# Patient Record
Sex: FEMALE | Race: WHITE | HISPANIC OR LATINO | Employment: FULL TIME | URBAN - METROPOLITAN AREA
[De-identification: names, ages, dates, MRNs, and addresses within clinical notes are randomized per-mention and may not be internally consistent; named-entity substitution may affect disease eponyms.]

---

## 2019-06-25 ENCOUNTER — OFFICE VISIT (OUTPATIENT)
Dept: INTERNAL MEDICINE CLINIC | Facility: CLINIC | Age: 49
End: 2019-06-25

## 2019-06-25 VITALS
SYSTOLIC BLOOD PRESSURE: 126 MMHG | HEIGHT: 62 IN | BODY MASS INDEX: 27.99 KG/M2 | TEMPERATURE: 98.1 F | WEIGHT: 152.12 LBS | DIASTOLIC BLOOD PRESSURE: 80 MMHG | HEART RATE: 72 BPM

## 2019-06-25 DIAGNOSIS — K30 INDIGESTION: Chronic | ICD-10-CM

## 2019-06-25 DIAGNOSIS — Z00.00 HEALTH CARE MAINTENANCE: Chronic | ICD-10-CM

## 2019-06-25 DIAGNOSIS — G43.109 MIGRAINE WITH AURA AND WITHOUT STATUS MIGRAINOSUS, NOT INTRACTABLE: Chronic | ICD-10-CM

## 2019-06-25 DIAGNOSIS — E66.3 OVERWEIGHT (BMI 25.0-29.9): Primary | Chronic | ICD-10-CM

## 2019-06-25 DIAGNOSIS — Z12.31 VISIT FOR SCREENING MAMMOGRAM: ICD-10-CM

## 2019-06-25 DIAGNOSIS — J30.2 SEASONAL ALLERGIES: Chronic | ICD-10-CM

## 2019-06-25 PROCEDURE — 99203 OFFICE O/P NEW LOW 30 MIN: CPT | Performed by: PHYSICIAN ASSISTANT

## 2019-06-26 ENCOUNTER — APPOINTMENT (OUTPATIENT)
Dept: LAB | Facility: CLINIC | Age: 49
End: 2019-06-26

## 2019-06-26 LAB
ALBUMIN SERPL BCP-MCNC: 3.8 G/DL (ref 3.5–5)
ALP SERPL-CCNC: 98 U/L (ref 46–116)
ALT SERPL W P-5'-P-CCNC: 25 U/L (ref 12–78)
ANION GAP SERPL CALCULATED.3IONS-SCNC: 5 MMOL/L (ref 4–13)
AST SERPL W P-5'-P-CCNC: 15 U/L (ref 5–45)
BASOPHILS # BLD AUTO: 0.04 THOUSANDS/ΜL (ref 0–0.1)
BASOPHILS NFR BLD AUTO: 1 % (ref 0–1)
BILIRUB SERPL-MCNC: 0.35 MG/DL (ref 0.2–1)
BUN SERPL-MCNC: 16 MG/DL (ref 5–25)
CALCIUM SERPL-MCNC: 9.6 MG/DL (ref 8.3–10.1)
CHLORIDE SERPL-SCNC: 105 MMOL/L (ref 100–108)
CHOLEST SERPL-MCNC: 186 MG/DL (ref 50–200)
CO2 SERPL-SCNC: 29 MMOL/L (ref 21–32)
CREAT SERPL-MCNC: 0.84 MG/DL (ref 0.6–1.3)
EOSINOPHIL # BLD AUTO: 0.07 THOUSAND/ΜL (ref 0–0.61)
EOSINOPHIL NFR BLD AUTO: 1 % (ref 0–6)
ERYTHROCYTE [DISTWIDTH] IN BLOOD BY AUTOMATED COUNT: 12.5 % (ref 11.6–15.1)
GFR SERPL CREATININE-BSD FRML MDRD: 82 ML/MIN/1.73SQ M
GLUCOSE P FAST SERPL-MCNC: 99 MG/DL (ref 65–99)
HCT VFR BLD AUTO: 43.6 % (ref 34.8–46.1)
HDLC SERPL-MCNC: 57 MG/DL (ref 40–60)
HGB BLD-MCNC: 13.6 G/DL (ref 11.5–15.4)
IMM GRANULOCYTES # BLD AUTO: 0.01 THOUSAND/UL (ref 0–0.2)
IMM GRANULOCYTES NFR BLD AUTO: 0 % (ref 0–2)
LDLC SERPL CALC-MCNC: 117 MG/DL (ref 0–100)
LYMPHOCYTES # BLD AUTO: 1.34 THOUSANDS/ΜL (ref 0.6–4.47)
LYMPHOCYTES NFR BLD AUTO: 24 % (ref 14–44)
MCH RBC QN AUTO: 26.8 PG (ref 26.8–34.3)
MCHC RBC AUTO-ENTMCNC: 31.2 G/DL (ref 31.4–37.4)
MCV RBC AUTO: 86 FL (ref 82–98)
MONOCYTES # BLD AUTO: 0.43 THOUSAND/ΜL (ref 0.17–1.22)
MONOCYTES NFR BLD AUTO: 8 % (ref 4–12)
NEUTROPHILS # BLD AUTO: 3.76 THOUSANDS/ΜL (ref 1.85–7.62)
NEUTS SEG NFR BLD AUTO: 66 % (ref 43–75)
NRBC BLD AUTO-RTO: 0 /100 WBCS
PLATELET # BLD AUTO: 218 THOUSANDS/UL (ref 149–390)
PMV BLD AUTO: 11.2 FL (ref 8.9–12.7)
POTASSIUM SERPL-SCNC: 4.3 MMOL/L (ref 3.5–5.3)
PROT SERPL-MCNC: 7.5 G/DL (ref 6.4–8.2)
RBC # BLD AUTO: 5.08 MILLION/UL (ref 3.81–5.12)
SODIUM SERPL-SCNC: 139 MMOL/L (ref 136–145)
TRIGL SERPL-MCNC: 61 MG/DL
WBC # BLD AUTO: 5.65 THOUSAND/UL (ref 4.31–10.16)

## 2019-06-26 PROCEDURE — 80053 COMPREHEN METABOLIC PANEL: CPT | Performed by: PHYSICIAN ASSISTANT

## 2019-06-26 PROCEDURE — 85025 COMPLETE CBC W/AUTO DIFF WBC: CPT | Performed by: PHYSICIAN ASSISTANT

## 2019-06-26 PROCEDURE — 80061 LIPID PANEL: CPT | Performed by: PHYSICIAN ASSISTANT

## 2019-06-26 PROCEDURE — 36415 COLL VENOUS BLD VENIPUNCTURE: CPT | Performed by: PHYSICIAN ASSISTANT

## 2021-01-18 ENCOUNTER — OFFICE VISIT (OUTPATIENT)
Dept: INTERNAL MEDICINE CLINIC | Facility: CLINIC | Age: 51
End: 2021-01-18

## 2021-01-18 VITALS
WEIGHT: 164 LBS | HEART RATE: 78 BPM | TEMPERATURE: 98.2 F | BODY MASS INDEX: 30.18 KG/M2 | DIASTOLIC BLOOD PRESSURE: 78 MMHG | SYSTOLIC BLOOD PRESSURE: 127 MMHG | OXYGEN SATURATION: 97 % | HEIGHT: 62 IN

## 2021-01-18 DIAGNOSIS — Z12.4 CERVICAL CANCER SCREENING: ICD-10-CM

## 2021-01-18 DIAGNOSIS — E66.09 CLASS 1 OBESITY DUE TO EXCESS CALORIES WITHOUT SERIOUS COMORBIDITY WITH BODY MASS INDEX (BMI) OF 30.0 TO 30.9 IN ADULT: ICD-10-CM

## 2021-01-18 DIAGNOSIS — E78.00 ELEVATED LDL CHOLESTEROL LEVEL: ICD-10-CM

## 2021-01-18 DIAGNOSIS — M65.9 TENOSYNOVITIS OF RIGHT WRIST: Primary | ICD-10-CM

## 2021-01-18 DIAGNOSIS — Z12.31 VISIT FOR SCREENING MAMMOGRAM: ICD-10-CM

## 2021-01-18 PROBLEM — Z00.00 HEALTH CARE MAINTENANCE: Chronic | Status: RESOLVED | Noted: 2019-06-25 | Resolved: 2021-01-18

## 2021-01-18 PROBLEM — E66.3 OVERWEIGHT (BMI 25.0-29.9): Chronic | Status: RESOLVED | Noted: 2019-06-25 | Resolved: 2021-01-18

## 2021-01-18 PROBLEM — K30 INDIGESTION: Chronic | Status: RESOLVED | Noted: 2019-06-25 | Resolved: 2021-01-18

## 2021-01-18 PROBLEM — E66.811 CLASS 1 OBESITY DUE TO EXCESS CALORIES WITHOUT SERIOUS COMORBIDITY WITH BODY MASS INDEX (BMI) OF 30.0 TO 30.9 IN ADULT: Status: ACTIVE | Noted: 2021-01-18

## 2021-01-18 PROCEDURE — 3008F BODY MASS INDEX DOCD: CPT | Performed by: PHYSICIAN ASSISTANT

## 2021-01-18 PROCEDURE — 3725F SCREEN DEPRESSION PERFORMED: CPT | Performed by: PHYSICIAN ASSISTANT

## 2021-01-18 PROCEDURE — 1036F TOBACCO NON-USER: CPT | Performed by: PHYSICIAN ASSISTANT

## 2021-01-18 PROCEDURE — 99213 OFFICE O/P EST LOW 20 MIN: CPT | Performed by: PHYSICIAN ASSISTANT

## 2021-01-18 RX ORDER — NAPROXEN 500 MG/1
500 TABLET ORAL 2 TIMES DAILY WITH MEALS
Qty: 28 TABLET | Refills: 0 | Status: SHIPPED | OUTPATIENT
Start: 2021-01-18 | End: 2021-05-05 | Stop reason: ALTCHOICE

## 2021-01-18 NOTE — PATIENT INSTRUCTIONS
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 and you are right hand dominant  We discussed importance of resting your right wrist and medication which has been sent to your pharmacy  Please take the naproxen 1 tablet twice a day with food for at least 7 days but up to 2 weeks  We reviewed the importance of resting your right hand and wrist because if you do not the pain will not have an opportunity to improve  This includes not doing repetitive motions in her home such as cooking and cleaning and getting some assistance to help you with those  If however your pain does not improve with medication and rest please call our office and you will need referral to physical therapy  Script provided today to call and schedule your mammogram and get your labs completed and once we have those results we can then get you scheduled for a well visit  Enfermedad de de Nasim   LO QUE NECESITA SABER:   ¿Qué es la enfermedad de Shaunna Eaves? La enfermedad de De [place]erv[place] es joana enfermedad inflamatoria de los tendones en el lado del pulgar en neal [address]  Los tendones son tiras de tejido gruesos que [employer] a los [place]  ¿Qué causa la enfermedad de Shaunna Eaves? La enfermedad de [place] es generalmente causada por movimientos frecuentes y repetidos del pulgar o la hugh  Por ejemplo, levantar un nory pequeño, coser, escribir en teclado, o tocar el piano pueden causar inflamación  Un golpe directo al pulgar también puede dañar el tendón y formar tejido cicatrizado  Wendy tejido cicatrizado puede impedir que el tendón funcione apropiadamente  ¿Cuáles son los signos y síntomas de la enfermedad de [place]? · Dolor e hinchazón cerca de la base de neal pulgar son los síntomas más comunes   [place] generalmente ocurre cuando usted mueve neal hugh hacia arriba y Sher, sostiene un objeto o hace un puño  · Usted escucha un eileen crujiente cuando mueve o fricciona aden pulgar o hugh  · Aden pulgar y [place] pueden estar débiles y usted puede tener movimiento limitado  ¿Cómo se diagnostica la enfermedad de [place]? Aden médico le preguntará acerca de aden historial médico y lo examinará  Él le pedirá que alyson un puño con aden pulgar tocando la pritchard de aden mano  Luego, él le pedirá que mueva aden mano y aden hugh en ciertas direcciones  ÉL revisará si usted tiene dolor, debilidad o problemas de [place]  Ambas trace pueden necesitar ser revisadas  Es posible que también necesite alguno de los siguientes tratamientos:  · Radiografía: Usted puede necesitar imágenes de aden hugh y mano para determinar si hay joana fractura  Anil-X de ambas trace y [employer] pueden ser realizadas  · Imágenes por resonancia magnética (IRM): Wendy escán [place] imanes kelly y Elinyaa Escobar computadora para nisha imágenes de aden hugh y [place]  Un IRM puede mostrar si usted tiene la enfermedad de De Quervain  Le podrían administrar un tinte para ayudar a que las imágenes se vean mejor  Dígale a los médicos si tiene alergia al iodo o a los mariscos  También podría ser alérgico al tinte  No entre a la jeferson donde se realiza la resonancia magnética con algo de metal  El metal puede causar lesiones serias  Informe a ki médicos si usted tiene algún metal dentro o sobre aden cuerpo  ¿Cómo se trata la enfermedad de [place] Dayan? · Medicamentos:     ? AINEs (analgésicos antiinflamatorios no esteroides): Estos medicamentos disminuyen la inflamación, dolor y [place]  Están disponibles sin receta médica  Pregunte cuál de estos medicamentos es apropiado para usted y qué dosis debería nisha  Tómelos len se le indique  Cuando no se marc de la [employer], los medicamentos antiinflamatorios no esteroides pueden causar sangrado estomacal o problemas renales  ? Inyección de esteroides: [place] reduce el dolor duradero y la inflamación   Usualmente se inyecta en neal hugh o mano  · Cirugía: [place] puede ser realizado si los otros tratamientos no funcionan o neal dolor interfiere con ki actividades diarias  [place]iss[place] la hospitals, joana incisión se hace en el tejido que cubre el tendón  [place] ayuda a aliviar la presión y reducir el dolor par que neal tendón puede moverse libremente  ¿Cómo puedo controlar los síntomas? · [place] o abrazadera: Estos aparatos pueden ayudar a disminuir el dolor, limitar el movimiento y proteger neal hugh para que sane  Asegúrese de que neal aparato es cómodo  Si le queda muy ajustado, ki dedos pueden sentirse entumecidos o con hormigueo  No empuje o se recueste en neal aparato porque se puede quebrar  · Fisioterapia o terapia ocupacional: Puede que necesite visitar un fisioterapeuta o un terapeuta ocupacional para que le enseñe ejercicios especiales  Estos ejercicios ayudarán a mejorar el movimiento y disminuir neal dolor  También puede ayudar a mejorar la fuerza y disminuir neal riesgo para pérdida de función  Los terapeutas pueden ayudarlo a hacer cambios a ki actividades diarias para disminuir el estrés y la presión en ki tendones  · Descanse: Descanse neal pulgar o hugh lesionado  Evite movimientos de torcer, agarrar o sostener  Pregunte cuándo puede regresar a ki [facility]  ¿Cuáles son los riesgos de la enfermedad de [place]? · Neal pulgar o neal hugh puede que no se muevan len hacían anteriormente, aun después de [place]  [place] puede nisha tiempo y compromiso a terapia para recuperar neal fuerza y el movimiento normal de neal pulgar y [address]  · Sin tratamiento, usted puede tener aumento de dolor e hinchazón  Con el tiempo, neal movimiento y función disminuirán  Eventualmente, usted no podrá  o usar neal pulgar o [address]  ¿Cuándo uzma comunicarme con mi médico?  · Neal férula o abrazadera está muy ajustada y no puede aflojarla  · Tiene fiebre  · Neal dolor e Pamelia Radha o no desaparecen      · Usted no puede sujetar objetos a causa del dolor e hinchazón  · Usted tiene preguntas o inquietudes acerca de neal condición o cuidado  ¿Cuándo uzma buscar atención inmediata? · Usted no puede  neal pulgar o neal hugh  · Ki dedos se sienten entumecidos, con hormigueo, frescos al tocar, o se pamela azulados o pálidos  ACUERDOS SOBRE NEAL CUIDADO:   Usted tiene el derecho de ayudar a planear neal cuidado  Aprenda todo lo que pueda sobre neal condición y len darle tratamiento  Discuta ki opciones de tratamiento con ki médicos para decidir el cuidado que usted desea recibir  Usted siempre tiene el derecho de rechazar el tratamiento  Esta información es sólo para uso en educación  Neal intención no es darle un consejo médico sobre enfermedades o tratamientos  Colsulte con neal [place]iva [place] farmacéutico antes de seguir cualquier régimen médico para saber si es seguro y efectivo para usted  © Copyright [address] Information is for End User's use only and may not be sold, redistributed or otherwise used for commercial purposes   All illustrations and images included in CareNotes® are the copyrighted property of A D A M , Inc  or [address] [place] [address]

## 2021-01-18 NOTE — LETTER
January 18, 2021     Patient: Sandy Morin   YOB: 1970   Date of Visit: 1/18/2021       To Whom it May Concern:    Sandy Morin is under my professional care  She was seen in my office on 1/18/2021  She may return to work on 1/23/2021  If you have any questions or concerns, please don't hesitate to call           Sincerely,          Octavio Onofre PA-C        CC: No Recipients

## 2021-01-18 NOTE — PROGRESS NOTES
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  We discussed importance of resting your right wrist and medication which has been sent to your pharmacy  Please take the naproxen 1 tablet twice a day with food for at least 7 days but up to 2 weeks  We reviewed the importance of resting your right hand and wrist because if you do not the pain will not have an opportunity to improve  This includes not doing repetitive motions in her home such as cooking and cleaning and getting some assistance to help you with those  If however your pain does not improve with medication and rest please call our office and you will need referral to physical therapy  Script provided today to call and schedule your mammogram and get your labs completed and once we have those results we can then get you scheduled for a well visit  No problem-specific Assessment & Plan notes found for this encounter  Diagnoses and all orders for this visit:    Tenosynovitis of right wrist  -     naproxen (NAPROSYN) 500 mg tablet; Take 1 tablet (500 mg total) by mouth 2 (two) times a day with meals for 14 days    Cervical cancer screening  -     Ambulatory referral to Obstetrics / Gynecology; Future    Elevated LDL cholesterol level  -     Comprehensive metabolic panel  -     Lipid Panel with Direct LDL reflex    Class 1 obesity due to excess calories without serious comorbidity with body mass index (BMI) of 30 0 to 30 9 in adult    Visit for screening mammogram  -     Mammo screening bilateral w cad; Future          Subjective:      Patient ID: Pedro Domingo is a 48 y o  female  Patient presents today for episodic with complaint of R wrist pain X 2 weeks  Never had before, states pain is R thumb extending into R forearm  Did not take any meds for pain       Of note 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  Patient reports she has had the same job for the past 2 years but denies pain prior to this  Patient denies any change in activities at home  Patient states she normally works Friday Saturday Sunday for 10 hours  Has not been seen since 2019 one time  Is overdue for health  Maintenance, mammo, gyn and now colon cancer screening  The following portions of the patient's history were reviewed and updated as appropriate: allergies, current medications, past family history, past medical history, past social history, past surgical history and problem list     Review of Systems   Constitutional: Negative  HENT: Negative  Respiratory: Negative  Negative for cough and shortness of breath  Cardiovascular: Negative  Negative for chest pain  Gastrointestinal: Negative  Musculoskeletal: Positive for arthralgias and myalgias  Negative for joint swelling  Skin: Negative for rash  Neurological: Negative for weakness and numbness  Psychiatric/Behavioral: Negative  Objective:      /78 (BP Location: Right arm, Patient Position: Sitting, Cuff Size: Standard)   Pulse 78   Temp 98 2 °F (36 8 °C) (Temporal)   Ht 5' 2" (1 575 m)   Wt 74 4 kg (164 lb)   SpO2 97%   BMI 30 00 kg/m²          Physical Exam  Vitals signs and nursing note reviewed  Constitutional:       General: She is not in acute distress  HENT:      Head: Normocephalic  Eyes:      Conjunctiva/sclera: Conjunctivae normal    Cardiovascular:      Rate and Rhythm: Normal rate and regular rhythm  Heart sounds: No murmur  Pulmonary:      Effort: Pulmonary effort is normal       Breath sounds: Normal breath sounds  Musculoskeletal:         General: Tenderness present  No swelling, deformity or signs of injury  Comments: Left upper extremity  5/5  Right upper extremity  3/5 secondary to eliciting pain      Positive Finkelstein on right negative on left   Negative Tinel and Phalen   Neurological:      Mental Status: She is alert  Sensory: No sensory deficit  Motor: No weakness  Deep Tendon Reflexes: Reflexes normal    Psychiatric:         Mood and Affect: Mood normal          Thought Content:  Thought content normal

## 2021-01-19 ENCOUNTER — APPOINTMENT (OUTPATIENT)
Dept: LAB | Facility: HOSPITAL | Age: 51
End: 2021-01-19
Payer: COMMERCIAL

## 2021-01-19 LAB
ALBUMIN SERPL BCP-MCNC: 3.8 G/DL (ref 3.5–5)
ALP SERPL-CCNC: 114 U/L (ref 46–116)
ALT SERPL W P-5'-P-CCNC: 32 U/L (ref 12–78)
ANION GAP SERPL CALCULATED.3IONS-SCNC: 4 MMOL/L (ref 4–13)
AST SERPL W P-5'-P-CCNC: 24 U/L (ref 5–45)
BILIRUB SERPL-MCNC: 0.52 MG/DL (ref 0.2–1)
BUN SERPL-MCNC: 11 MG/DL (ref 5–25)
CALCIUM SERPL-MCNC: 9.4 MG/DL (ref 8.3–10.1)
CHLORIDE SERPL-SCNC: 110 MMOL/L (ref 100–108)
CHOLEST SERPL-MCNC: 156 MG/DL (ref 50–200)
CO2 SERPL-SCNC: 28 MMOL/L (ref 21–32)
CREAT SERPL-MCNC: 0.72 MG/DL (ref 0.6–1.3)
GFR SERPL CREATININE-BSD FRML MDRD: 98 ML/MIN/1.73SQ M
GLUCOSE P FAST SERPL-MCNC: 94 MG/DL (ref 65–99)
HDLC SERPL-MCNC: 60 MG/DL
LDLC SERPL CALC-MCNC: 86 MG/DL (ref 0–100)
POTASSIUM SERPL-SCNC: 4.2 MMOL/L (ref 3.5–5.3)
PROT SERPL-MCNC: 7.3 G/DL (ref 6.4–8.2)
SODIUM SERPL-SCNC: 142 MMOL/L (ref 136–145)
TRIGL SERPL-MCNC: 51 MG/DL

## 2021-01-19 PROCEDURE — 36415 COLL VENOUS BLD VENIPUNCTURE: CPT | Performed by: PHYSICIAN ASSISTANT

## 2021-01-19 PROCEDURE — 80061 LIPID PANEL: CPT | Performed by: PHYSICIAN ASSISTANT

## 2021-01-19 PROCEDURE — 80053 COMPREHEN METABOLIC PANEL: CPT | Performed by: PHYSICIAN ASSISTANT

## 2021-05-05 ENCOUNTER — OFFICE VISIT (OUTPATIENT)
Dept: INTERNAL MEDICINE CLINIC | Facility: CLINIC | Age: 51
End: 2021-05-05

## 2021-05-05 VITALS
WEIGHT: 167 LBS | OXYGEN SATURATION: 99 % | BODY MASS INDEX: 30.73 KG/M2 | HEART RATE: 60 BPM | TEMPERATURE: 97.6 F | HEIGHT: 62 IN | SYSTOLIC BLOOD PRESSURE: 130 MMHG | DIASTOLIC BLOOD PRESSURE: 84 MMHG

## 2021-05-05 DIAGNOSIS — J30.2 SEASONAL ALLERGIES: Chronic | ICD-10-CM

## 2021-05-05 DIAGNOSIS — E66.09 CLASS 1 OBESITY DUE TO EXCESS CALORIES WITHOUT SERIOUS COMORBIDITY WITH BODY MASS INDEX (BMI) OF 30.0 TO 30.9 IN ADULT: ICD-10-CM

## 2021-05-05 DIAGNOSIS — Z00.00 ANNUAL PHYSICAL EXAM: Primary | ICD-10-CM

## 2021-05-05 DIAGNOSIS — Z12.11 COLON CANCER SCREENING: ICD-10-CM

## 2021-05-05 PROCEDURE — 3008F BODY MASS INDEX DOCD: CPT | Performed by: PHYSICIAN ASSISTANT

## 2021-05-05 PROCEDURE — 1036F TOBACCO NON-USER: CPT | Performed by: PHYSICIAN ASSISTANT

## 2021-05-05 PROCEDURE — 99396 PREV VISIT EST AGE 40-64: CPT | Performed by: PHYSICIAN ASSISTANT

## 2021-05-05 RX ORDER — LORATADINE 10 MG/1
10 TABLET ORAL DAILY
Qty: 90 TABLET | Refills: 1 | Status: SHIPPED | OUTPATIENT
Start: 2021-05-05 | End: 2021-09-16

## 2021-05-05 RX ORDER — OLOPATADINE HYDROCHLORIDE 1 MG/ML
1 SOLUTION/ DROPS OPHTHALMIC 2 TIMES DAILY PRN
Qty: 5 ML | Refills: 1 | Status: SHIPPED | OUTPATIENT
Start: 2021-05-05

## 2021-05-05 RX ORDER — FLUTICASONE PROPIONATE 50 MCG
1 SPRAY, SUSPENSION (ML) NASAL DAILY
Qty: 1 BOTTLE | Refills: 1 | Status: SHIPPED | OUTPATIENT
Start: 2021-05-05 | End: 2021-07-08

## 2021-05-05 NOTE — PATIENT INSTRUCTIONS
We completed your annual health visit today  You are currently up-to-date with most of your health maintenance including dentist and eye exams  I have reprinted her order so you can schedule your mammogram and also please remember to schedule with gyn for routine women's health and Pap smear  Script also provided for referral to GI to get scheduled for colon cancer screening  While you admit to healthy diet we did review the importance of increasing her physical activity for healthy meaningful weight loss  At discharge you will be able to get scheduled for your COVID vaccine  Seasonal allergy medication has been sent to your pharmacy  We did review however if your insurance does not cover the medications these are available over-the-counter  Control del peso   LO QUE NECESITA SABER:   ¿Por qué es importante controlar mi peso corporal? Tener sobrepeso aumenta neal riesgo de presentar problemas de [place] len enfermedades del corazón, hipertensión, diabetes tipo 2 y ciertos tipos de cáncer  También puede aumentar neal riesgo de presentar osteoartritis, apnea del sueño y otros problemas respiratorios  Trate de bajar de peso de forma gradual y [place]  Incluso joana mínima pérdida de peso puede disminuir neal riesgo de problemas de [place]  ¿Cómo puedo bajar de peso de Daryle Melara forma de la rosa? Joana forma de la rosa y saludable para perder peso es consumir menos calorías y realizar joana actividad física en forma regular  · Usted puede perder hasta 1 sae por semana al reducir el consumo de 500 calorías cada día  Usted puede reducir el consumo de calorías al comer porciones más pequeñas o eliminar los alimentos con alto contenido de calorías  Sruthi las etiquetas para determinar la cantidad de calorías que contienen los alimentos que consume  · También puede quemar calorías al realizar ejercicio len caminar, nadar o montar en bicicleta   Es más probable que usted mantenga el peso si hace de [place] cambios parte de neal estilo de jonnie  Realice joana actividad física por lo menos 30 minutos al día, la mayoría de los días de la [place]  Usted también puede realizar más actividad física usando las escaleras en vez de los ascensores o estacionarse más lejos cuando Oral Nan a las tiendas  Pregunte a neal médico acerca del mejor plan de ejercicio para usted  ¿Cuál es un plan de alimentación qué me puede ayudar a controlar mi peso? Un plan de alimentación saludable incluye joana variedad de alimentos, contiene más pocas calorías y lo [place] a estar saludable  Un plan de alimentación saludable incluye lo siguiente:     · Consuma alimentos de grano integral con más frecuencia  Un plan de alimentación saludable debe contener alimentos con fibra  La fibra es la parte de las frutas, verduras y granos que neal cuerpo no puede digerir  Los alimentos de granos integrales son saludables y suministran fibra adicional a neal Irina Hasting  Algunos ejemplos de alimentos de granos integrales son los panes integrales, pastas integrales, la alexandra, el arroz integral y jorge a de bulgur  · Consuma joana variedad de verduras todos los días  [address], coliflor, col henok y [place]  Coma verduras anaranjadas len las zanahorias, bella dulces y calabaza de invierno  · Consuma joana variedad de frutas todos los sunday  Escoja frutas frescas o enlatadas en neal propio jugo o con jugo bajo en [place] en vez de jugo  El [place] de frutas tiene [address] o my nada de [place]  · Consuma productos lácteos con bajo contenido de Wes Pulley [place] [address] de 1%  Consuma yogur descremado y requesón (cottage) semidescremado  Trate de consumir quesos descremados len el queso mozzarela y otros quesos semidescremado  · Seleccione meka y otros alimentos con proteínas bajos en grasa  Escoja frijoles u [address]   Seleccione pescado, carne de aves sin piel (len el Ricka Riis), schultz de carne [place] (de res o de cerdo)  Antes de cocinar las meka o las aves jose cualquier parte de grasa visible  · Use menos grasas y aceites  Trate de hornear los alimentos en lugar de freírlos  Agregue a las [address] [facility], len [place], crema [place], condimentos para [place] y [facility]  Consuma menos alimentos de alto tenor graso  Coma menos alimentos altos en grasa len las bella fritas, donas, helados y pasteles  · Consuma menos dulces  Limite los alimentos y las bebidas con un gran contenido de azúcar  Estos incluyen los caramelos, galletas, gaseosas normales y bebidas endulzadas  ¿Cuáles son las otras formas en que puedo disminuir las calorías? · Reduzca el tamaño de las porciones  ? Use platos pequeños para servirse porciones pequeñas  ? No coma segundas porciones  ? Cuando coma en un restaurante, pida joana Priscella Hoyles y guarde en verenice la mitad de la comida antes de empezar a comer  ? Comparta con alguien un plato de entrada  · Reemplace los bocadillos o meriendas altos en calorías por los saludables de menos calorías  ? Escoja frutas frescas, verduras, galletas de arroz bajo en grasa o palomitas de maíz en lugar de comer bella fritas de paquete, nueces o dulces de chocolate  ? [place] agua o bebidas dietéticas en lugar de las endulzadas  · No vaya al johnson cuando tenga hambre  Usted podría ser más propenso a elegir alimentos no saludables  Lleve joana lista de alimentos saludables y vaya de compras después de steven comido  · Coma ki comidas regularmente  No se salte ninguna comida  No omita ninguna comida porque esto puede conducir a comer más a joana hora más tarde del día  [place] podría traerle problemas para perder peso  Si no tiene tiempo para hacer comidas regulares, consuma un refrigerio saludable  Hable con un dietista para que lo ayude a crear un plan de comidas y un horario que alo adecuados para usted      ¿Qué más debería tener en cuenta mientras trato de bajar de peso? · Esté consciente de las situaciones que podrían ocasionarle ganas de comer en exceso, len el comer mientras molly la televisión  Busque formas para evitar estas situaciones  Por ejemplo, leer un libro, caminar o hacer trabajos manuales  · Reúnase con un bronson de apoyo o con personas que también están tratando de bajar de [place]  [place] le puede ayudar a mantenerse motivado y continuar progresando en neal objetivo de perder peso  ACUERDOS SOBRE NEAL CUIDADO:   Usted tiene el derecho de ayudar a planear neal cuidado  Aprenda todo lo que pueda sobre neal condición y len darle tratamiento  Discuta ki opciones de tratamiento con ki médicos para decidir el cuidado que usted desea recibir  Usted siempre tiene el derecho de rechazar el tratamiento  Esta información es sólo para uso en educación  Neal intención no es darle un consejo médico sobre enfermedades o tratamientos  Colsulte con neal Ladena Creed farmacéutico antes de seguir cualquier régimen médico para saber si es seguro y efectivo para usted  © Copyright [address] Information is for End User's use only and may not be sold, redistributed or otherwise used for commercial purposes   All illustrations and images included in CareNotes® are the copyrighted property of A D A LOYAL3 , Inc  or sciencebite [facility]

## 2021-05-05 NOTE — PROGRESS NOTES
[address] [facility]    NAME: Yolette Barkley  AGE: 46 y o  SEX: female  : 1970     DATE: 2021     Assessment and Plan: We completed your annual health visit today  You are currently up-to-date with most of your health maintenance including dentist and eye exams  I have reprinted her order so you can schedule your mammogram and also please remember to schedule with gyn for routine women's health and Pap smear  Script also provided for referral to GI to get scheduled for colon cancer screening  While you admit to healthy diet we did review the importance of increasing her physical activity for healthy meaningful weight loss  At discharge you will be able to get scheduled for your COVID vaccine  Seasonal allergy medication has been sent to your pharmacy  We did review however if your insurance does not cover the medications these are available over-the-counter  Problem List Items Addressed This Visit        Other    Class 1 obesity due to excess calories without serious comorbidity with body mass index (BMI) of 30 0 to 30 9 in adult    Seasonal allergies (Chronic)    Relevant Medications    loratadine (CLARITIN) 10 mg tablet    fluticasone (FLONASE) 50 mcg/act nasal spray    olopatadine (PATANOL) 0 1 % ophthalmic solution      Other Visit Diagnoses     Annual physical exam    -  Primary    Colon cancer screening        Relevant Orders    Ambulatory referral to Gastroenterology          Immunizations and preventive care screenings were discussed with patient today  Appropriate education was printed on patient's after visit summary  Counseling:  Alcohol/drug use: discussed moderation in alcohol intake, the recommendations for healthy alcohol use, and avoidance of illicit drug use  Dental Health: discussed importance of regular tooth brushing, flossing, and dental visits    · Exercise: the importance of regular exercise/physical activity was discussed  Recommend exercise 3-5 times per week for at least 30 minutes  Return in about 1 year (around 2022)  Chief Complaint:     Chief Complaint   Patient presents with    Annual Exam     Patient dosen't have any complaint  Patient agreeable to do the colonoscopy      History of Present Illness:     Adult Annual Physical   Patient here for a comprehensive physical exam  The patient reports problems - seasonal allergy symptoms  Diet and Physical Activity  · Diet/Nutrition: well balanced diet, limited junk food, consuming 3-5 servings of fruits/vegetables daily and adequate fiber intake  · Exercise: walking, 1-2 times a week on average and less than 30 minutes on average  Depression Screening  PHQ-9 Depression Screening    PHQ-9:   Frequency of the following problems over the past two weeks:           General Health  · Sleep: gets 7-8 hours of sleep on average  · Hearing: normal - bilateral   · Vision: vision problems: reading  Last exam 3 months ago with Dr Bridget Arzate  · Dental: regular dental visits and brushes teeth three times daily  /GYN Health  · Patient is: postmenopausal  ·      Review of Systems:     Review of Systems   Constitutional: Negative  Negative for appetite change, chills, fever and unexpected weight change  HENT: Positive for congestion, postnasal drip and sneezing  Eyes: Positive for itching  Respiratory: Negative  Cardiovascular: Negative  Gastrointestinal: Negative  Endocrine: Negative  Genitourinary: Negative  Musculoskeletal: Negative  Neurological: Negative  Psychiatric/Behavioral: Negative         Past Medical History:     Past Medical History:   Diagnosis Date    Migraine       Past Surgical History:     Past Surgical History:   Procedure Laterality Date     SECTION      Two , /    HYSTERECTOMY      15 years ago for heavy bleeding      Social History:     E-Cigarette/Vaping    E-Cigarette Use Never User      E-Cigarette/Vaping Substances    Nicotine No     THC No     CBD No     Flavoring No     Other No     Unknown No      Social History     Socioeconomic History    Marital status: Single     Spouse name: None    Number of children: None    Years of education: None    Highest education level: None   Occupational History    None   Social Needs    Financial resource strain: Not hard at all   [place] insecurity     Worry: Never true     Inability: Never true    Transportation needs     Medical: No     Non-medical: No   Tobacco Use    Smoking status: Never Smoker    Smokeless tobacco: Never Used   Substance and Sexual Activity    Alcohol use: Never     Frequency: Never     Binge frequency: Never    Drug use: Never    Sexual activity: Not Currently   Lifestyle    Physical activity     Days per week: 0 days     Minutes per session: 0 min    Stress: Not at all   Relationships    Social connections     Talks on phone: Once a week     Gets together: Once a week     Attends Restorationism service: Never     Active member of club or organization: No     Attends meetings of clubs or organizations: Never     Relationship status: Never     Intimate partner violence     Fear of current or ex partner: No     Emotionally abused: No     Physically abused: No     Forced sexual activity: No   Other Topics Concern    None   Social History Narrative    None      Family History:     Family History   Problem Relation Age of Onset    No Known Problems Mother     No Known Problems Father     No Known Problems Sister     No Known Problems Brother     No Known Problems Daughter     No Known Problems Brother     No Known Problems Brother     No Known Problems Brother     No Known Problems Daughter       Current Medications:     Current Outpatient Medications   Medication Sig Dispense Refill    fluticasone (FLONASE) 50 mcg/act nasal spray 1 spray into each nostril daily 1 Bottle 1    loratadine (CLARITIN) 10 mg tablet Take 1 tablet (10 mg total) by mouth daily 90 tablet 1    naproxen (NAPROSYN) 500 mg tablet Take 1 tablet (500 mg total) by mouth 2 (two) times a day with meals for 14 days (Patient not taking: Reported on 5/5/2021) 28 tablet 0    olopatadine (PATANOL) 0 1 % ophthalmic solution Administer 1 drop to both eyes 2 (two) times a day as needed for allergies 5 mL 1     No current facility-administered medications for this visit  Allergies:     No Known Allergies   Physical Exam:     /84 (BP Location: Right arm, Patient Position: Sitting, Cuff Size: Standard)   Pulse 60   Temp 97 6 °F (36 4 °C) (Temporal)   Ht 5' 2" (1 575 m)   Wt 75 8 kg (167 lb)   SpO2 99%   BMI 30 54 kg/m²     Physical Exam  Vitals signs and nursing note reviewed  Constitutional:       General: She is not in acute distress  Appearance: She is obese  HENT:      Head: Normocephalic  Nose: Congestion present  Eyes:      Conjunctiva/sclera: Conjunctivae normal    Neck:      Musculoskeletal: Neck supple  Vascular: No carotid bruit  Cardiovascular:      Rate and Rhythm: Normal rate and regular rhythm  Heart sounds: Normal heart sounds  Pulmonary:      Effort: Pulmonary effort is normal       Breath sounds: Normal breath sounds  Abdominal:      General: Bowel sounds are normal       Tenderness: There is no abdominal tenderness  Musculoskeletal:      Right lower leg: No edema  Left lower leg: No edema  Neurological:      General: No focal deficit present  Mental Status: She is alert  Psychiatric:         Mood and Affect: Mood normal          Thought Content: Thought content normal          Judgment: Judgment normal           Jaqueline Burgess PA-C  [place] [facility]  BMI Counseling: Body mass index is 30 54 kg/m²   The BMI is above normal  Nutrition recommendations include reducing portion sizes, 3-5 servings of fruits/vegetables daily, reducing intake of saturated fat and trans fat and reducing intake of cholesterol  Exercise recommendations include vigorous aerobic physical activity for 75 minutes/week

## 2021-07-08 DIAGNOSIS — J30.2 SEASONAL ALLERGIES: Chronic | ICD-10-CM

## 2021-07-08 RX ORDER — FLUTICASONE PROPIONATE 50 MCG
SPRAY, SUSPENSION (ML) NASAL
Qty: 16 ML | Refills: 1 | Status: SHIPPED | OUTPATIENT
Start: 2021-07-08 | End: 2021-09-16

## 2021-09-16 ENCOUNTER — OFFICE VISIT (OUTPATIENT)
Dept: INTERNAL MEDICINE CLINIC | Facility: CLINIC | Age: 51
End: 2021-09-16

## 2021-09-16 VITALS
HEART RATE: 76 BPM | WEIGHT: 168.4 LBS | TEMPERATURE: 97.7 F | DIASTOLIC BLOOD PRESSURE: 78 MMHG | OXYGEN SATURATION: 98 % | SYSTOLIC BLOOD PRESSURE: 124 MMHG | HEIGHT: 62 IN | BODY MASS INDEX: 30.99 KG/M2

## 2021-09-16 DIAGNOSIS — J30.2 SEASONAL ALLERGIES: Primary | Chronic | ICD-10-CM

## 2021-09-16 PROCEDURE — 3008F BODY MASS INDEX DOCD: CPT | Performed by: PHYSICIAN ASSISTANT

## 2021-09-16 PROCEDURE — 99213 OFFICE O/P EST LOW 20 MIN: CPT | Performed by: PHYSICIAN ASSISTANT

## 2021-09-16 PROCEDURE — 1036F TOBACCO NON-USER: CPT | Performed by: PHYSICIAN ASSISTANT

## 2021-09-16 RX ORDER — IPRATROPIUM BROMIDE 42 UG/1
SPRAY, METERED NASAL
Qty: 15 ML | Refills: 3 | Status: SHIPPED | OUTPATIENT
Start: 2021-09-16

## 2021-09-16 RX ORDER — MONTELUKAST SODIUM 10 MG/1
10 TABLET ORAL
Qty: 30 TABLET | Refills: 3 | Status: SHIPPED | OUTPATIENT
Start: 2021-09-16

## 2021-09-16 NOTE — PROGRESS NOTES
Assessment/Plan:      Diagnoses and all orders for this visit:    Seasonal allergies  -     montelukast (SINGULAIR) 10 mg tablet; Take 1 tablet (10 mg total) by mouth daily at bedtime  -     ipratropium (ATROVENT) 0 06 % nasal spray; Use 2 sprays in each nostril up to 4 times a day PRN runny nose/allergy symptoms      patient is a very pleasant Urdu-speaking 75-year-old female presenting today for concern of uncontrolled allergies taking Flonase and Claritin with intermittent relief only  Exam relatively unrevealing, no evidence of bacterial sinusitis requiring other treatment  No other concerning evidence of COVID or other infectious type process  Patient is agreeable to changing of her treatment plan at this time  I will have her tentatively discontinue the Claritin and Flonase for now since they are not as affective  Patient is agreeable to starting singular 10 milligram once a day at bedtime every night as well as use of a different nasal spray, will trial ipratropium 2 sprays in each nostril at least twice a day morning and before bed and can use an additional 2 times in the middle of the day if needed  Patient advised to contact the office in a few weeks if no improvement with symptoms, otherwise will follow up regularly with her PCP as planned which it appears next office visit scheduled for May for annual physical     [employer] telephone  used for todays appt   Chief Complaint   Patient presents with    Follow-up     sneezing, eyes burning- flonse has not been helping       Subjective:     Patient ID: Keyanna Painting is a 46 y o  female     49y/o female here today for discussion of seasonal allergies  She reports allergies are worse  She is using the claritin and flonase every day, using it as prescribed  She notes she still gets sneezing and watery eyes intermittent, states it goes away for a week and then comes back      She notes itchy, watery eyes, runny nose and sneezing  At times gets a dry cough  No fever or chills  She deneis sore throat, N/V/D  Review of Systems   Constitutional: Negative  HENT:        As in HPI   Eyes:        As in HPI   Respiratory: Negative  Cardiovascular: Negative  Musculoskeletal: Negative for arthralgias and myalgias  Skin: Negative  The following portions of the patient's history were reviewed and updated as appropriate: allergies, current medications, past family history, past medical history, past social history, past surgical history and problem list       Objective:     Physical Exam  Vitals reviewed  Constitutional:       General: She is not in acute distress  Appearance: Normal appearance  She is not ill-appearing or toxic-appearing  HENT:      Head: Normocephalic and atraumatic  Right Ear: Tympanic membrane and ear canal normal       Left Ear: Tympanic membrane and ear canal normal       Nose: Mucosal edema present  No congestion or rhinorrhea  Right Turbinates: Swollen  Left Turbinates: Swollen  Mouth/Throat:      Mouth: Mucous membranes are moist       Pharynx: Oropharynx is clear  Cardiovascular:      Rate and Rhythm: Normal rate and regular rhythm  Pulses: Normal pulses  Heart sounds: Normal heart sounds  Pulmonary:      Effort: Pulmonary effort is normal       Breath sounds: Normal breath sounds  Musculoskeletal:      Cervical back: Neck supple  Right lower leg: No edema  Left lower leg: No edema  Lymphadenopathy:      Head:      Right side of head: No submandibular or tonsillar adenopathy  Left side of head: No submandibular or tonsillar adenopathy  Neurological:      Mental Status: She is alert and oriented to person, place, and time  Psychiatric:         Mood and Affect: Mood normal          Behavior: Behavior normal  Behavior is cooperative           Vitals:    09/16/21 1314   BP: 124/78   BP Location: Right arm Patient Position: Sitting   Cuff Size: Large   Pulse: 76   Temp: 97 7 °F (36 5 °C)   TempSrc: Temporal   SpO2: 98%   Weight: 76 4 kg (168 lb 6 4 oz)   Height: 5' 2" (1 575 m)

## 2022-08-02 ENCOUNTER — OFFICE VISIT (OUTPATIENT)
Dept: INTERNAL MEDICINE CLINIC | Facility: CLINIC | Age: 52
End: 2022-08-02

## 2022-08-02 VITALS
DIASTOLIC BLOOD PRESSURE: 88 MMHG | SYSTOLIC BLOOD PRESSURE: 137 MMHG | HEART RATE: 77 BPM | BODY MASS INDEX: 25.79 KG/M2 | TEMPERATURE: 97.4 F | WEIGHT: 141 LBS | OXYGEN SATURATION: 98 %

## 2022-08-02 DIAGNOSIS — L30.1 DYSHIDROTIC ECZEMA: Primary | ICD-10-CM

## 2022-08-02 PROCEDURE — 3725F SCREEN DEPRESSION PERFORMED: CPT | Performed by: INTERNAL MEDICINE

## 2022-08-02 PROCEDURE — 99214 OFFICE O/P EST MOD 30 MIN: CPT | Performed by: INTERNAL MEDICINE

## 2022-08-02 NOTE — LETTER
August 2, 2022     Patient: Grant Naidu  YOB: 1970  Date of Visit: 8/2/2022      To Whom it May Concern:    Grant Naidu is under my professional care  Lilia Mancera was seen in my office on 8/2/2022  She has developed eczema possible due to the use of chemical  If possible, she should avoid chemical to prevent the worsening of the condition  If you have any questions or concerns, please don't hesitate to call           Sincerely,          Dayan Smith MD        CC: No Recipients

## 2022-08-02 NOTE — PROGRESS NOTES
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    Recommend Cetaphil lotion  Cotton gloves at night  Letter provided for work to avoid chemical agents  F/u as needed     No orders of the defined types were placed in this encounter  Chief Complaint     Chief Complaint   Patient presents with    Annual Exam       History of Present Illness     HPI    Pt is a 47 y/o F w/ PMHx of Migraines, Seasonal allergies, HLD, Obesity who presents for a rash on the fingers of both hand  Its affecting the first and last digit the most  About a 2 cm patch with pin point lesion with dry and scaly skin  Does work with a 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  Does not have a rash anywhere else  Denies any systemic symptoms  Denies any chest pain, SOB, abdominal pain, nausea, vomiting, fever or chills  The following portions of the patient's history were reviewed and updated as appropriate: allergies, current medications, past family history, past medical history, past social history, past surgical history and problem list     Review of Systems     Review of Systems   Constitutional: Negative for chills, fatigue and unexpected weight change  HENT: Negative for drooling and voice change  Eyes: Negative for visual disturbance  Respiratory: Negative for cough and wheezing  Cardiovascular: Negative for chest pain, palpitations and leg swelling  Gastrointestinal: Negative for diarrhea and vomiting  Endocrine: Negative for polyuria  Skin: Positive for rash     Neurological: Negative for weakness  Active Problem List     Patient Active Problem List   Diagnosis    Seasonal allergies    Migraine with aura and without status migrainosus, not intractable    Visit for screening mammogram    Elevated LDL cholesterol level    Class 1 obesity due to excess calories without serious comorbidity with body mass index (BMI) of 30 0 to 30 9 in adult       Objective     /88 (BP Location: Left arm, Patient Position: Sitting, Cuff Size: Large)   Pulse 77   Temp (!) 97 4 °F (36 3 °C) (Temporal)   Wt 64 kg (141 lb)   SpO2 98%   BMI 25 79 kg/m²     Physical Exam  Constitutional:       General: She is not in acute distress  Appearance: She is not ill-appearing, toxic-appearing or diaphoretic  HENT:      Head: Normocephalic and atraumatic  Nose: No congestion or rhinorrhea  Cardiovascular:      Rate and Rhythm: Normal rate and regular rhythm  Pulses: Normal pulses  Heart sounds: Normal heart sounds  No gallop  Pulmonary:      Effort: Pulmonary effort is normal       Breath sounds: Normal breath sounds  No wheezing or rhonchi  Abdominal:      General: Bowel sounds are normal       Tenderness: There is no abdominal tenderness  There is no right CVA tenderness or left CVA tenderness  Musculoskeletal:      Right lower leg: No edema  Left lower leg: No edema  Skin:     Findings: Rash (multiple lesion in both hands affecting the thumb) present  Neurological:      General: No focal deficit present  Mental Status: She is oriented to person, place, and time  Mental status is at baseline     Psychiatric:         Mood and Affect: Mood normal          Behavior: Behavior normal          Pertinent Laboratory/Diagnostic Studies:  CBC:   Lab Results   Component Value Date/Time    WBC 5 65 06/26/2019 08:51 AM    RBC 5 08 06/26/2019 08:51 AM    HGB 13 6 06/26/2019 08:51 AM    HCT 43 6 06/26/2019 08:51 AM    MCV 86 06/26/2019 08:51 AM    MCH 26 8 06/26/2019 08:51 AM    MCHC 31 2 (L) 06/26/2019 08:51 AM    RDW 12 5 06/26/2019 08:51 AM    MPV 11 2 06/26/2019 08:51 AM     06/26/2019 08:51 AM    NRBC 0 06/26/2019 08:51 AM    NEUTOPHILPCT 66 06/26/2019 08:51 AM    LYMPHOPCT 24 06/26/2019 08:51 AM    MONOPCT 8 06/26/2019 08:51 AM    EOSPCT 1 06/26/2019 08:51 AM    BASOPCT 1 06/26/2019 08:51 AM    NEUTROABS 3 76 06/26/2019 08:51 AM    LYMPHSABS 1 34 06/26/2019 08:51 AM    MONOSABS 0 43 06/26/2019 08:51 AM    EOSABS 0 07 06/26/2019 08:51 AM     Chemistry Profile:   Lab Results   Component Value Date/Time    K 4 2 01/19/2021 09:53 AM     (H) 01/19/2021 09:53 AM    CO2 28 01/19/2021 09:53 AM    BUN 11 01/19/2021 09:53 AM    CREATININE 0 72 01/19/2021 09:53 AM    GLUF 94 01/19/2021 09:53 AM    CALCIUM 9 4 01/19/2021 09:53 AM    AST 24 01/19/2021 09:53 AM    ALT 32 01/19/2021 09:53 AM    ALKPHOS 114 01/19/2021 09:53 AM    EGFR 98 01/19/2021 09:53 AM     Coagulation Studies: No results found for: PROTIME, INR, PTT  CBC: No results for input(s): WBC, RBC, HGB, HCT, MCV, MCH, MCHC, RDW, MPV, PLT, NRBC, NEUTOPHILPCT, LYMPHOPCT, MONOPCT, EOSPCT, BASOPCT, NEUTROABS, LYMPHSABS, MONOSABS, EOSABS, MONOSABS in the last 8784 hours  Chemistry Profile: No results for input(s): NA, K, CL, CO2, ANIONGAP, BUN, CREATININE, GLUF, GLUC, GLUCOSE, CALCIUM, CORRECTEDCA, MG, PHOS, AST, ALT, ALKPHOS, PROT, BILITOT, EGFR, GFRAA, GFRNONAA, EGFRAA, EGFRNAA, EGFRNONAFA in the last 8784 hours  Invalid input(s): EXTSODIUM, EXTPOTASSIUM, EXTCO2, EXTANIONGAP, EXTBUN, EXTCREAT, EXTGLUBLD, GLU, SLAMBGLUCOSE, EXTCALCIUM, CAADJUST, ALBUMIN, SERUMALBUMIN, EXTEGFR  Coagulation Studies: No results for input(s): PROTIME, INR, PTT in the last 8784 hours      Current Medications     Current Outpatient Medications:     ipratropium (ATROVENT) 0 06 % nasal spray, Use 2 sprays in each nostril up to 4 times a day PRN runny nose/allergy symptoms, Disp: 15 mL, Rfl: 3    montelukast (SINGULAIR) 10 mg tablet, Take 1 tablet (10 mg total) by mouth daily at bedtime, Disp: 30 tablet, Rfl: 3    olopatadine (PATANOL) 0 1 % ophthalmic solution, Administer 1 drop to both eyes 2 (two) times a day as needed for allergies, Disp: 5 mL, Rfl: 1    Health Maintenance     Health Maintenance   Topic Date Due    Hepatitis C Screening  Never done    HIV Screening  Never done    DTaP,Tdap,and Td Vaccines (1 - Tdap) Never done    Cervical Cancer Screening  Never done    Breast Cancer Screening: Mammogram  Never done    Colorectal Cancer Screening  Never done    COVID-19 Vaccine (3 - Booster for Moderna series) 12/15/2021    BMI: Followup Plan  05/05/2022    Annual Physical  05/05/2022    Influenza Vaccine (1) 09/01/2022    BMI: Adult  09/16/2022    Depression Screening  08/02/2023    Pneumococcal Vaccine: Pediatrics (0 to 5 Years) and At-Risk Patients (6 to 59 Years)  Aged Out    HIB Vaccine  Aged Out    Hepatitis B Vaccine  Aged Out    IPV Vaccine  Aged Out    Hepatitis A Vaccine  Aged Out    Meningococcal ACWY Vaccine  Aged Out    HPV Vaccine  Aged [employer] History   Administered Date(s) Administered    COVID-19 MODERNA VACC 0 5 ML IM 06/17/2021, 07/15/2021       Malu Hsu MD  PGY 3

## 2022-08-02 NOTE — PATIENT INSTRUCTIONS

## 2022-08-10 ENCOUNTER — OFFICE VISIT (OUTPATIENT)
Dept: INTERNAL MEDICINE CLINIC | Facility: CLINIC | Age: 52
End: 2022-08-10

## 2022-08-10 VITALS
HEART RATE: 99 BPM | SYSTOLIC BLOOD PRESSURE: 146 MMHG | TEMPERATURE: 97.4 F | BODY MASS INDEX: 31.13 KG/M2 | DIASTOLIC BLOOD PRESSURE: 89 MMHG | OXYGEN SATURATION: 98 % | WEIGHT: 170.2 LBS

## 2022-08-10 DIAGNOSIS — J30.2 SEASONAL ALLERGIES: Primary | ICD-10-CM

## 2022-08-10 PROCEDURE — 99213 OFFICE O/P EST LOW 20 MIN: CPT | Performed by: HOSPITALIST

## 2022-08-10 RX ORDER — DIPHENHYDRAMINE HCL 25 MG
25 TABLET ORAL EVERY 6 HOURS PRN
Qty: 30 TABLET | Refills: 0 | Status: SHIPPED | OUTPATIENT
Start: 2022-08-10

## 2022-08-10 RX ORDER — LORATADINE 10 MG/1
10 TABLET ORAL DAILY
Qty: 30 TABLET | Refills: 1 | Status: SHIPPED | OUTPATIENT
Start: 2022-08-10

## 2022-08-10 NOTE — PROGRESS NOTES
INTERNAL MEDICINE [address] [place] [address] [address] [facility] [address], [address]    NAME: Fabiano Matos  AGE: 46 y o  SEX: female    DATE OF ENCOUNTER: 8/10/2022    Assessment and Plan     1  Seasonal allergies    Endorse itchy, watery eyes, itchy throat and congestion  Takes Singulair at night  Will add claritin for day time  Benadryl 25-50mg at night time  Allergy referral   Follow up prn    - Ambulatory Referral to Allergy; Future  - loratadine (CLARITIN) 10 mg tablet; Take 1 tablet (10 mg total) by mouth daily  Dispense: 30 tablet; Refill: 1  - diphenhydrAMINE (BENADRYL) 25 mg tablet; Take 1 tablet (25 mg total) by mouth every 6 (six) hours as needed for itching or sleep  Dispense: 30 tablet; Refill: 0  - phenylephrine (OLGA-SYNEPHRINE) 0 125 % nasal drops; 1 drop into each nostril every 4 (four) hours as needed for congestion  Dispense: 15 mL; Refill: 0    No orders of the defined types were placed in this encounter       - Counseling Documentation: patient was counseled regarding: diagnostic results    Chief Complaint     Chief Complaint   Patient presents with    Follow-up       History of Present Illness     HPI      Pt is a 45 y/o F w/ PMHx of seasonal allergies, LDL, Obesity, migraines who is here for a same day appointment  Pt endorses watery eyes, runny nose, itcy throat  She has been taking singulair at night but nothing else for the symptoms  She denies any systemic symptoms  Denies any chest pain, SOB, abdominal pain, nausea, vomiting, fever or chills  Will send symptomatic control meds  Follow up as needed      The following portions of the patient's history were reviewed and updated as appropriate: allergies, current medications, past family history, past medical history, past social history, past surgical history and problem list     Review of Systems     Review of Systems   HENT: Positive for congestion   Negative for drooling, ear discharge and ear pain     Respiratory: Negative for wheezing  Active Problem List     Patient Active Problem List   Diagnosis    Seasonal allergies    Migraine with aura and without status migrainosus, not intractable    Visit for screening mammogram    Elevated LDL cholesterol level    Class 1 obesity due to excess calories without serious comorbidity with body mass index (BMI) of 30 0 to 30 9 in adult       Objective     /89 (BP Location: Right arm, Patient Position: Sitting, Cuff Size: Large)   Pulse 99   Temp (!) 97 4 °F (36 3 °C) (Temporal)   Wt 77 2 kg (170 lb 3 2 oz)   SpO2 98%   BMI 31 13 kg/m²     Physical Exam  Constitutional:       General: She is not in acute distress  Appearance: She is normal weight  She is not toxic-appearing or diaphoretic  HENT:      Head: Normocephalic and atraumatic  Nose: No congestion or rhinorrhea  Cardiovascular:      Rate and Rhythm: Normal rate and regular rhythm  Pulses: Normal pulses  Heart sounds: Normal heart sounds  No murmur heard  No friction rub  No gallop  Pulmonary:      Effort: Pulmonary effort is normal  No respiratory distress  Breath sounds: Normal breath sounds  No rales  Chest:      Chest wall: No tenderness  Abdominal:      General: Abdomen is flat  Bowel sounds are normal       Palpations: Abdomen is soft  Tenderness: There is no right CVA tenderness or left CVA tenderness  Musculoskeletal:         General: No swelling  Right lower leg: No edema  Left lower leg: No edema  Neurological:      General: No focal deficit present  Mental Status: She is alert  Mental status is at baseline  She is disoriented     Psychiatric:         Mood and Affect: Mood normal          Behavior: Behavior normal          Judgment: Judgment normal          Pertinent Laboratory/Diagnostic Studies:  CBC:   Lab Results   Component Value Date/Time    WBC 5 65 06/26/2019 08:51 AM    RBC 5 08 06/26/2019 08:51 AM    HGB 13 6 06/26/2019 08:51 AM    HCT 43 6 06/26/2019 08:51 AM    MCV 86 06/26/2019 08:51 AM    MCH 26 8 06/26/2019 08:51 AM    MCHC 31 2 (L) 06/26/2019 08:51 AM    RDW 12 5 06/26/2019 08:51 AM    MPV 11 2 06/26/2019 08:51 AM     06/26/2019 08:51 AM    NRBC 0 06/26/2019 08:51 AM    NEUTOPHILPCT 66 06/26/2019 08:51 AM    LYMPHOPCT 24 06/26/2019 08:51 AM    MONOPCT 8 06/26/2019 08:51 AM    EOSPCT 1 06/26/2019 08:51 AM    BASOPCT 1 06/26/2019 08:51 AM    NEUTROABS 3 76 06/26/2019 08:51 AM    LYMPHSABS 1 34 06/26/2019 08:51 AM    MONOSABS 0 43 06/26/2019 08:51 AM    EOSABS 0 07 06/26/2019 08:51 AM     Chemistry Profile:   Lab Results   Component Value Date/Time    K 4 2 01/19/2021 09:53 AM     (H) 01/19/2021 09:53 AM    CO2 28 01/19/2021 09:53 AM    BUN 11 01/19/2021 09:53 AM    CREATININE 0 72 01/19/2021 09:53 AM    GLUF 94 01/19/2021 09:53 AM    CALCIUM 9 4 01/19/2021 09:53 AM    AST 24 01/19/2021 09:53 AM    ALT 32 01/19/2021 09:53 AM    ALKPHOS 114 01/19/2021 09:53 AM    EGFR 98 01/19/2021 09:53 AM     CBC: No results for input(s): WBC, RBC, HGB, HCT, MCV, MCH, MCHC, RDW, MPV, PLT, NRBC, NEUTOPHILPCT, LYMPHOPCT, MONOPCT, EOSPCT, BASOPCT, NEUTROABS, LYMPHSABS, MONOSABS, EOSABS, MONOSABS in the last 8784 hours  Chemistry Profile: No results for input(s): NA, K, CL, CO2, ANIONGAP, BUN, CREATININE, GLUF, GLUC, GLUCOSE, CALCIUM, CORRECTEDCA, MG, PHOS, AST, ALT, ALKPHOS, PROT, BILITOT, EGFR, GFRAA, GFRNONAA, EGFRAA, EGFRNAA, EGFRNONAFA in the last 8784 hours      Invalid input(s): EXTSODIUM, EXTPOTASSIUM, EXTCO2, EXTANIONGAP, EXTBUN, EXTCREAT, EXTGLUBLD, GLU, SLAMBGLUCOSE, EXTCALCIUM, CAADJUST, ALBUMIN, SERUMALBUMIN, EXTEGFR    Current Medications     Current Outpatient Medications:     ipratropium (ATROVENT) 0 06 % nasal spray, Use 2 sprays in each nostril up to 4 times a day PRN runny nose/allergy symptoms, Disp: 15 mL, Rfl: 3    montelukast (SINGULAIR) 10 mg tablet, Take 1 tablet (10 mg total) by mouth daily at bedtime, Disp: 30 tablet, Rfl: 3    olopatadine (PATANOL) 0 1 % ophthalmic solution, Administer 1 drop to both eyes 2 (two) times a day as needed for allergies, Disp: 5 mL, Rfl: 1    Health Maintenance     Health Maintenance   Topic Date Due    Hepatitis C Screening  Never done    HIV Screening  Never done    Cervical Cancer Screening  Never done    Breast Cancer Screening: Mammogram  Never done    Colorectal Cancer Screening  Never done    BMI: Followup Plan  05/05/2022    Annual Physical  05/05/2022    Influenza Vaccine (1) 09/01/2022    COVID-19 Vaccine (3 - Booster for Moderna series) 11/10/2022 (Originally 12/15/2021)    DTaP,Tdap,and Td Vaccines (1 - Tdap) 08/10/2023 (Originally 2/20/1991)    Depression Screening  08/02/2023    BMI: Adult  08/02/2023    Pneumococcal Vaccine: Pediatrics (0 to 5 Years) and At-Risk Patients (6 to 59 Years)  Aged Out    HIB Vaccine  Aged Out    Hepatitis B Vaccine  Aged Out    IPV Vaccine  Aged Out    Hepatitis A Vaccine  Aged Out    Meningococcal ACWY Vaccine  Aged Out    HPV Vaccine  Aged [employer] History   Administered Date(s) Administered    COVID-19 MODERNA VACC 0 5 ML IM 06/17/2021, 07/15/2021       Vidal Goodell, MD  PGY 3

## 2025-04-15 ENCOUNTER — OFFICE VISIT (OUTPATIENT)
Dept: INTERNAL MEDICINE CLINIC | Facility: CLINIC | Age: 55
End: 2025-04-15

## 2025-04-15 VITALS
HEART RATE: 91 BPM | WEIGHT: 175 LBS | TEMPERATURE: 98 F | DIASTOLIC BLOOD PRESSURE: 91 MMHG | HEIGHT: 62 IN | BODY MASS INDEX: 32.2 KG/M2 | OXYGEN SATURATION: 98 % | SYSTOLIC BLOOD PRESSURE: 148 MMHG

## 2025-04-15 DIAGNOSIS — K21.9 GASTROESOPHAGEAL REFLUX DISEASE WITHOUT ESOPHAGITIS: ICD-10-CM

## 2025-04-15 DIAGNOSIS — M75.42 ROTATOR CUFF IMPINGEMENT SYNDROME OF LEFT SHOULDER: ICD-10-CM

## 2025-04-15 DIAGNOSIS — H91.92 HEARING DIFFICULTY OF LEFT EAR: ICD-10-CM

## 2025-04-15 DIAGNOSIS — Z12.31 ENCOUNTER FOR SCREENING MAMMOGRAM FOR BREAST CANCER: ICD-10-CM

## 2025-04-15 DIAGNOSIS — Z12.4 SCREENING FOR CERVICAL CANCER: ICD-10-CM

## 2025-04-15 DIAGNOSIS — Z11.59 NEED FOR HEPATITIS C SCREENING TEST: ICD-10-CM

## 2025-04-15 DIAGNOSIS — Z11.4 SCREENING FOR HIV (HUMAN IMMUNODEFICIENCY VIRUS): ICD-10-CM

## 2025-04-15 DIAGNOSIS — J30.9 ALLERGIC RHINITIS, UNSPECIFIED SEASONALITY, UNSPECIFIED TRIGGER: Primary | ICD-10-CM

## 2025-04-15 PROCEDURE — 99205 OFFICE O/P NEW HI 60 MIN: CPT | Performed by: STUDENT IN AN ORGANIZED HEALTH CARE EDUCATION/TRAINING PROGRAM

## 2025-04-15 RX ORDER — LORATADINE 10 MG/1
10 TABLET ORAL DAILY
Qty: 90 TABLET | Refills: 2 | Status: CANCELLED | OUTPATIENT
Start: 2025-04-15

## 2025-04-15 RX ORDER — FAMOTIDINE 40 MG/1
40 TABLET, FILM COATED ORAL DAILY
Qty: 30 TABLET | Refills: 2 | Status: SHIPPED | OUTPATIENT
Start: 2025-04-15

## 2025-04-15 RX ORDER — LEVOCETIRIZINE DIHYDROCHLORIDE 5 MG/1
5 TABLET, FILM COATED ORAL EVERY EVENING
Qty: 90 TABLET | Refills: 1 | Status: SHIPPED | OUTPATIENT
Start: 2025-04-15

## 2025-04-15 NOTE — PATIENT INSTRUCTIONS
Estamos aumentando neal dosis de famotidina a 40 mg. Tomará james medicamento todos los días.    También vamos a empezar con xyzal, que puedes nisha todas las noches para la congestión nasal. Le daremos un aerosol nasal de solución salina para ayudar con la nariz seca.    Le vamos a edgar a que le farzad joana prueba de audición.    Le vamos a derivar a fisioterapia para el hombro. También le daremos un gel para que se ponga en el hombro, voltaren, que puede poner en el hombro 4 veces al día para el dolor.    Regrese en 6 semanas para un seguimiento y un examen físico anual.

## 2025-04-15 NOTE — PROGRESS NOTES
Adult Annual Physical  Name: Ruby Shields      : 1970      MRN: [id number]  Encounter Provider: Quintin Hernandez MD  Encounter Date: 4/15/2025   Encounter department: [facility] [place]    :  Assessment & Plan  Allergic rhinitis, unspecified seasonality, unspecified trigger  Patient is also complaining about chronic cough. She has a history seasonal allergies and GERD. Follows w/ asthma and allergy specialist in [place]. Last seen 2 months ago. Previously on loratidine for allergic rhinitis, but no longer taking this. Also currently on famotidine 20mg 1x weekly. She states that she has been coughing and sneezing a lot for the past 2 months. Sometimes has nosebleeds when nose feels dry. She has been blowing her nose frequently. Was in [facility] 2 months ago and had URI. Endorses runny and stuffy nose as well as mucus in back of throat.  Denies hx asthma although appears to have been taking medications for asthma in the past. Denies sore throat, ear pain, fevers, or chills.      Concern that several factors may be contributing to this patient's cough. Based on presentation it appears that sh may have post-nasal drip due to allergic rhinitis. Uncertain why she is no longer taking medications for this. Her GERD also is not likely adequately controlled as she is only on weekly dosing of famotidine. Also possible post-URI cough given recency of URI.  Also possible history of asthma??? however patient denies. Unable to see records from Asthma and Allergy specialist at [place] who patient sees.    Plan  Start xyzal 5mg daily  Increase famotidine to 40mg daily  Saline spry ordered for dry nose/nosebleeds  Will follow-up in 6 weeks  Orders:    sodium chloride (OCEAN) 0.65 % nasal spray; 1 spray into each nostril as needed for rhinitis    levocetirizine (XYZAL) 5 MG tablet; Take 1 tablet (5 mg total) by mouth every evening    Rotator cuff impingement syndrome of left  shoulder  She is complaining of L arm pain for the past 8 months which has progressively worsened. States that the pain starts in her L shoulder and radiates down whole arm. Bothers her at work as . Pain is worse w/ movement or lifting objects with the arm. Associated w/ tingling in hand. Denies fevers or chills, neck pain, or other joint pain. Hawkin's and lift off tests positive on exam. Drop test negative. ROM slightly decreased. Strength normal. No crepitus.    Concern for rotator cuff impingement considering physical exam results. Unlikely tear/rupture due to normal strength. I do not suspect osteoarthritis as there is no crepitus and more likely rotator cuff related. Concern that cervical radiculopathy may be contributing to pain as sensation does radiate and patient has pain w/ neck movement and positive Spurling.    Plan  Voltaren gel ordered for pain  Patient referred to physical therapy  Patient also encouraged to ice the shoulder and rest to help with pain  No need for imaging at this point. May consider imaging of neck if pain does not improve as there is also suspicion for cervical radiculopathy based on physical exam       Orders:    Ambulatory Referral to Physical Therapy; Future    Diclofenac Sodium (VOLTAREN) 1 %; Apply 2 g topically 4 (four) times a day    Hearing difficulty of left ear  Patient is also complaining of decreased hearing in her L ear. She states that this has been going on for the past 5-6 months. Denies any prior ear infections, pain in the ear, difficulty w/ balance, tinnitus, ear drainage, or loud noise exposure. Otoscopic exam normal. No lymphadenopathy.    Plan  Will order hearing test   Plan to follow up hearing test at next visit in 6 weeks.  Consider referral to ENT  Orders:    Audiogram screen; Future    Gastroesophageal reflux disease without esophagitis  Patient w/ prior history of GERD on famotidine 20mg weekly. Concern that this may be contributing to chronic  cough. Dose of famotidine likely too low to suppress cough.    Plan  Increase famotidine to 40mg daily  Orders:    famotidine (PEPCID) 40 MG tablet; Take 1 tablet (40 mg total) by mouth daily            Immunizations:  - Immunizations due: Influenza, Tdap and Zoster (Shingrix)         History of Present Illness     Adult Annual Physical:  Patient presents for annual physical. 56 y/o F w/ PMH allergic rhinitis, GERD who presents for annual physical and has several chief complaints.    She is complaining of L arm pain for the past 8 months which has progressively worsened. States that the pain starts in her L shoulder and radiates down whole arm. Bothers her at work as . Pain is worse w/ movement or lifting objects with the arm. Associated w/ tingling in hand. Denies fevers or chills, neck pain, or other joint pain.    Patient is also complaining about chronic cough. She has a history seasonal allergies and GERD. Follows w/ asthma and allergy specialist in [place]. Last seen 2 months ago. Previously on loratidine for allergic rhinitis, but no longer taking this. Also currently on famotidine 20mg 1x weekly. She states that she has been coughing and sneezing a lot for the past 2 months. Sometimes has nosebleeds when nose feels dry. She has been blowing her nose frequently. Was in [facility] 2 months ago and had URI. Endorses runny and stuffy nose as well as mucus in back of throat.  Denies hx asthma although appears to have been taking medications for asthma in the past. Denies sore throat, ear pain, fevers, or chills.      Patient is also complaining of decreased hearing in her L ear. She states that this has been going on for the past 5-6 months. Denies any prior ear infections, pain in the ear, difficulty w/ balance, tinnitus, ear drainage, or loud noise exposure.  .     Diet and Physical Activity:  - Diet/Nutrition: no special diet and well balanced diet.  - Exercise: walking.    Depression Screening:  -  "PHQ-2 Score: 0    General Health:  - Sleep: sleeps well and 7-8 hours of sleep on average.  - Hearing: decreased hearing left ear. 5-6 months  - Vision: no vision problems.  - Dental: regular dental visits and brushes teeth twice daily.    Review of Systems   Constitutional:  Negative for chills, fatigue and fever.   HENT:  Positive for hearing loss, nosebleeds, postnasal drip, rhinorrhea and sneezing. Negative for ear discharge, ear pain, sore throat and tinnitus.    Respiratory:  Negative for shortness of breath.    Cardiovascular:  Negative for chest pain and palpitations.   Gastrointestinal:  Negative for abdominal pain, constipation, diarrhea, nausea and vomiting.   Genitourinary:  Negative for difficulty urinating.   Musculoskeletal:  Negative for neck pain and neck stiffness.   Skin:  Negative for rash.   Neurological:  Negative for dizziness, weakness, light-headedness and headaches.         Objective   /91 (BP Location: Left arm, Patient Position: Sitting, Cuff Size: Standard)   Pulse 91   Temp 98 °F (36.7 °C) (Temporal)   Ht 5' 2\" (1.575 m)   Wt 79.4 kg (175 lb)   SpO2 98%   BMI 32.01 kg/m²     Physical Exam  Constitutional:       General: She is not in acute distress.  HENT:      Head: Normocephalic.      Right Ear: Tympanic membrane, ear canal and external ear normal. There is no impacted cerumen.      Left Ear: Tympanic membrane, ear canal and external ear normal. There is no impacted cerumen.      Nose: Nose normal.      Mouth/Throat:      Mouth: Mucous membranes are moist.      Pharynx: No pharyngeal swelling or posterior oropharyngeal erythema.      Tonsils: No tonsillar exudate.      Comments: Mallampati 4  Eyes:      Extraocular Movements: Extraocular movements intact.      Pupils: Pupils are equal, round, and reactive to light.   Neck:      Comments: Spurling positive  Cardiovascular:      Rate and Rhythm: Normal rate and regular rhythm.      Pulses: Normal pulses.      Heart sounds: " Normal heart sounds. No murmur heard.     No friction rub. No gallop.   Pulmonary:      Effort: Pulmonary effort is normal. No respiratory distress.      Breath sounds: No wheezing, rhonchi or rales.   Abdominal:      General: Abdomen is flat. Bowel sounds are normal. There is no distension.      Palpations: Abdomen is soft.      Tenderness: There is no abdominal tenderness. There is no guarding.      Hernia: No hernia is present.   Musculoskeletal:      Right shoulder: Normal.      Left shoulder: Tenderness present. No crepitus. Decreased range of motion. Normal strength.      Right upper arm: Normal.      Left upper arm: Normal.      Cervical back: Normal range of motion. Tenderness present. Pain with movement and muscular tenderness present.      Comments: Hawkin's positive, Lift off positive, drop test negtive; B/l UE strength normal   Lymphadenopathy:      Cervical: No cervical adenopathy.   Neurological:      Mental Status: She is alert.           Quintin Hernandez PGY-1  Internal Medicine

## 2025-06-03 ENCOUNTER — OFFICE VISIT (OUTPATIENT)
Dept: INTERNAL MEDICINE CLINIC | Facility: CLINIC | Age: 55
End: 2025-06-03

## 2025-06-03 VITALS
SYSTOLIC BLOOD PRESSURE: 158 MMHG | DIASTOLIC BLOOD PRESSURE: 81 MMHG | WEIGHT: 175 LBS | TEMPERATURE: 98 F | BODY MASS INDEX: 32.01 KG/M2 | HEART RATE: 80 BPM | OXYGEN SATURATION: 98 %

## 2025-06-03 DIAGNOSIS — Z00.00 ANNUAL PHYSICAL EXAM: Primary | ICD-10-CM

## 2025-06-03 DIAGNOSIS — Z12.31 ENCOUNTER FOR SCREENING MAMMOGRAM FOR BREAST CANCER: ICD-10-CM

## 2025-06-03 DIAGNOSIS — Z11.4 SCREENING FOR HIV (HUMAN IMMUNODEFICIENCY VIRUS): ICD-10-CM

## 2025-06-03 DIAGNOSIS — Z01.419 ENCOUNTER FOR GYNECOLOGICAL EXAMINATION: ICD-10-CM

## 2025-06-03 DIAGNOSIS — Z11.59 NEED FOR HEPATITIS C SCREENING TEST: ICD-10-CM

## 2025-06-03 DIAGNOSIS — H91.92 HEARING DIFFICULTY OF LEFT EAR: ICD-10-CM

## 2025-06-03 DIAGNOSIS — Z13.9 SCREENING DUE: ICD-10-CM

## 2025-06-03 DIAGNOSIS — R03.0 ELEVATED BLOOD PRESSURE READING: ICD-10-CM

## 2025-06-03 DIAGNOSIS — R41.3 MEMORY IMPAIRMENT: ICD-10-CM

## 2025-06-03 PROCEDURE — 99214 OFFICE O/P EST MOD 30 MIN: CPT | Performed by: STUDENT IN AN ORGANIZED HEALTH CARE EDUCATION/TRAINING PROGRAM

## 2025-06-03 PROCEDURE — 99396 PREV VISIT EST AGE 40-64: CPT | Performed by: STUDENT IN AN ORGANIZED HEALTH CARE EDUCATION/TRAINING PROGRAM

## 2025-06-03 NOTE — PATIENT INSTRUCTIONS
We are going to have you get some blood work done to check for what may be causing the memory loss.     We are also going to send you to get a hearing test done.    Please go to the gynecologist for Pap smear.    Please go to gastroenterologist for colonoscopy.    Please get mammogram.    Please come back in 2 weeks to re-check your blood pressure and go over blood work results.    Vamos a hacer que te hagas un análisis de liset para verificar qué puede estar causando la pérdida de memoria. También te enviaremos a hacerte joana prueba de audición.Por favor, ve al ginecólogo para un Papanicolau. Por favor, ve al gastroenterólogo para joana colonoscopia. Por favor, hazte joana mamografía. Por favor, regresa en 2 semanas para volver a revisar tu presión arterial y repasar los resultados del análisis de liset.    Por favor, realice los análisis de liset y la prueba de audición antes de neal próxima eric.

## 2025-06-03 NOTE — PROGRESS NOTES
Name: Ruby Shields      : 1970      MRN: [id number]  Encounter Provider: Quintin Hernandez MD  Encounter Date: 6/3/2025   Encounter department: [facility] [place]  :  Assessment & Plan  Annual physical exam  Diet - balanced diet  Exercise - exercises 30-40 minutes by walking; x3 per week  Dentist - follows w/ a dentist who she saw 6 months ago  Vision -  no vision issues  Depression negative         Elevated blood pressure reading  Blood Pressure after the second check (158/81)   New diagnosis of hypertensionYes  Patient is already on antihypertensive medicationsNo  Patient is complaint with the antihypertensive medicationsNot applicable  Adjusting doses of antihypertensive medicationsN/A  Adding new antihypertensive medicationsNo  Number of antihypertensive medications before the visit0  Number of antihypertensive medications after the visit0   Will return to clinic in 2 weeks for repeat BP and consider starting anti-hypertensive       Memory impairment  Patient endorses memory issues. States that this started about a few years ago. States that people will ask her questions and she forgets what they ask her. States that this is impacting her ability to carry out daily tasks such as shopping. She states that she is trying to take her citizenship exam and is having trouble remembering things for the test. Denies any drugs or alcohol. Was told by doctor in [place] that this was because of her migraines. Endorses difficulty w/ balance and difficulty hearing. No issues w/ incontinence . Depression screening negative. Romberg negative on physical exam.     Differential diagnosis including psychosomatic manifestation of depression, 2/2 to hearing loss, vitamin deficiency, endocrinopathy, early onset dementia, 2/2 to migraines.    Plan  TSH, B1, B9, B12, and vitamin D ordered  Patient encouraged to get audiogram done  Consider MRI neuroquant depending on lab  results  Consider focused depression screening  Return in 2 weeks to go over labs    Orders:    Vitamin B12/Folate, Serum Panel; Future    TSH, 3rd generation with Free T4 reflex; Future    Vitamin B1, whole blood; Future    Vitamin D 25 hydroxy; Future    Hearing difficulty of left ear  Patient endorsing difficulty hearing out of L ear at prior visit. Ordered audiogram at that time, however patient did not get the test done.    Patient encouraged to get audiogram done       Encounter for screening mammogram for breast cancer    Orders:    Mammo screening bilateral w 3d and cad; Future    Screening for HIV (human immunodeficiency virus)    Orders:    HIV 1/2 AG/AB w Reflex [facility] for 2 yr old and above; Future    Need for hepatitis C screening test    Orders:    Hepatitis C Antibody; Future    Encounter for gynecological examination    Orders:    Ambulatory referral to Obstetrics / Gynecology; Future    Screening due    Orders:    Ambulatory Referral to Gastroenterology; Future    Lipid panel; Future    Hemoglobin A1C; Future          Depression Screening and Follow-up Plan: Patient was screened for depression during today's encounter. They screened negative with a PHQ-2 score of 0.        History of Present Illness   Patient is a 56 y/o F w/ PMH migraines and GERD who presents for annual physical. Patient endorses memory issues. States that this started about a few years ago. States that people will ask her questions and she forgets what they ask her. States that this is impacting her ability to carry out daily tasks such as shopping. She states that she is trying to take her citizenship exam and is having trouble remembering things for the test. Denies any drugs or alcohol. Was told by doctor in [place] that this was because of her migraines. Endorses difficulty w/ balance and difficulty hearing. No issues w/ incontinence . Depression screening negative.       Review of Systems   Constitutional:  Negative for  chills and fever.   Respiratory:  Negative for chest tightness and shortness of breath.    Cardiovascular:  Negative for chest pain and palpitations.   Gastrointestinal:  Negative for abdominal pain, diarrhea, nausea and vomiting.   Genitourinary:  Negative for difficulty urinating.   Skin:  Negative for rash.   Neurological:  Negative for headaches.       Objective   /81 (BP Location: Right arm, Patient Position: Sitting, Cuff Size: Adult)   Pulse 80   Temp 98 °F (36.7 °C) (Temporal)   Wt 79.4 kg (175 lb)   SpO2 98%   BMI 32.01 kg/m²      Physical Exam  Constitutional:       General: She is not in acute distress.  HENT:      Head: Normocephalic and atraumatic.      Right Ear: Tympanic membrane normal.      Left Ear: Tympanic membrane normal.      Mouth/Throat:      Mouth: Mucous membranes are moist.     Eyes:      Extraocular Movements: Extraocular movements intact.      Pupils: Pupils are equal, round, and reactive to light.       Cardiovascular:      Rate and Rhythm: Normal rate and regular rhythm.      Pulses: Normal pulses.      Heart sounds: Normal heart sounds. No murmur heard.     No friction rub. No gallop.   Pulmonary:      Effort: Pulmonary effort is normal. No respiratory distress.      Breath sounds: Normal breath sounds. No wheezing, rhonchi or rales.   Abdominal:      General: Abdomen is flat. There is no distension.      Palpations: Abdomen is soft.      Tenderness: There is no abdominal tenderness. There is no guarding.      Hernia: No hernia is present.     Musculoskeletal:      Right lower leg: No edema.      Left lower leg: No edema.     Skin:     Capillary Refill: Capillary refill takes less than 2 seconds.     Neurological:      General: No focal deficit present.      Mental Status: She is alert and oriented to person, place, and time. Mental status is at baseline.      Coordination: Romberg sign negative.     Psychiatric:         Cognition and Memory: Memory is impaired.            Quintin Hernandez PGY-1  Internal Medicine

## 2025-07-29 ENCOUNTER — TELEPHONE (OUTPATIENT)
Dept: INTERNAL MEDICINE CLINIC | Facility: CLINIC | Age: 55
End: 2025-07-29